# Patient Record
Sex: MALE | Race: BLACK OR AFRICAN AMERICAN
[De-identification: names, ages, dates, MRNs, and addresses within clinical notes are randomized per-mention and may not be internally consistent; named-entity substitution may affect disease eponyms.]

---

## 2020-06-29 ENCOUNTER — HOSPITAL ENCOUNTER (EMERGENCY)
Dept: HOSPITAL 54 - ER | Age: 30
Discharge: HOME | End: 2020-06-29
Payer: COMMERCIAL

## 2020-06-29 VITALS — HEIGHT: 68 IN | BODY MASS INDEX: 42.44 KG/M2 | WEIGHT: 280 LBS

## 2020-06-29 VITALS — DIASTOLIC BLOOD PRESSURE: 85 MMHG | SYSTOLIC BLOOD PRESSURE: 147 MMHG
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
## 2020-06-29 DIAGNOSIS — Z60.2: ICD-10-CM

## 2020-06-29 DIAGNOSIS — S69.81XA: Primary | ICD-10-CM

## 2020-06-29 DIAGNOSIS — L03.011: ICD-10-CM
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
## 2020-06-29 DIAGNOSIS — Z98.890: ICD-10-CM

## 2020-06-29 PROCEDURE — 99283 EMERGENCY DEPT VISIT LOW MDM: CPT

## 2020-06-29 PROCEDURE — 10060 I&D ABSCESS SIMPLE/SINGLE: CPT

## 2020-06-29 PROCEDURE — 73140 X-RAY EXAM OF FINGER(S): CPT

## 2020-06-29 SDOH — SOCIAL STABILITY - SOCIAL INSECURITY: PROBLEMS RELATED TO LIVING ALONE: Z60.2

## 2020-06-29 NOTE — NUR
PT'S THUMB WAS DRESSED AND SPLINTED. THUMB WAS BLEEDING THROUGH GUAZE DRESSING. 
THUMB WAS REWRAPPED. Patient discharged to home in stable condition. Written 
and verbal after care instructions given. Patient verbalizes understanding of 
instruction AND RX. PT AMBULATED OUT WITH A STEADY GAIT. VSS